# Patient Record
Sex: MALE | Race: WHITE | NOT HISPANIC OR LATINO | Employment: FULL TIME | URBAN - METROPOLITAN AREA
[De-identification: names, ages, dates, MRNs, and addresses within clinical notes are randomized per-mention and may not be internally consistent; named-entity substitution may affect disease eponyms.]

---
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
## 2024-01-07 PROBLEM — A60.01 HERPES SIMPLEX INFECTION OF PENIS: Status: ACTIVE | Noted: 2024-01-07

## 2024-01-08 ENCOUNTER — OFFICE VISIT (OUTPATIENT)
Dept: INTERNAL MEDICINE | Facility: CLINIC | Age: 19
End: 2024-01-08
Payer: OTHER GOVERNMENT

## 2024-01-08 DIAGNOSIS — R21 RASH: Primary | ICD-10-CM

## 2024-01-08 PROCEDURE — 99214 OFFICE O/P EST MOD 30 MIN: CPT | Mod: 95,,, | Performed by: INTERNAL MEDICINE

## 2024-01-08 RX ORDER — VALACYCLOVIR HYDROCHLORIDE 500 MG/1
500 TABLET, FILM COATED ORAL 2 TIMES DAILY
Qty: 30 TABLET | Refills: 0 | Status: SHIPPED | OUTPATIENT
Start: 2024-01-08 | End: 2024-02-07 | Stop reason: SDUPTHER

## 2024-01-08 NOTE — PROGRESS NOTES
Subjective:      Patient ID: Kelechi Suh is a 18 y.o. male.    Chief Complaint: No chief complaint on file.    Rash  This is a recurrent problem. The current episode started more than 1 month ago. The problem has been waxing and waning since onset. The affected locations include the face. The rash is characterized by pain and redness. It is unknown if there was an exposure to a precipitant. Pertinent negatives include no anorexia, congestion, cough, diarrhea, eye pain, facial edema, fatigue, fever, joint pain, nail changes, rhinorrhea, shortness of breath, sore throat or vomiting. Past treatments include antibiotic cream and oral steroids. The treatment provided moderate relief. His past medical history is significant for asthma and eczema. There is no history of allergies or varicella.       The patient location is: [place]  The chief complaint leading to consultation is: rash    Visit type:  Audiovisual    Face to Face time with patient: 15   minutes of total time spent on the encounter, which includes face to face time and non-face to face time preparing to see the patient (eg, review of tests), Obtaining and/or reviewing separately obtained history, Documenting clinical information in the electronic or other health record, Independently interpreting results (not separately reported) and communicating results to the patient/family/caregiver, or Care coordination (not separately reported).         Each patient to whom he or she provides medical services by telemedicine is:  (1) informed of the relationship between the physician and patient and the respective role of any other health care provider with respect to management of the patient; and (2) notified that he or she may decline to receive medical services by telemedicine and may withdraw from such care at any time.    Notes:     19 yo with   Patient Active Problem List   Diagnosis    Attention deficit hyperactivity disorder (ADHD)    Herpes simplex  infection of penis     Past Medical History:   Diagnosis Date    ADHD (attention deficit hyperactivity disorder)     Asthma     Asthma        Presents via telehealth to discuss rash to face.  No recurrent outbreak in genital area since initial episode in apri.    Has noticed waxing and waning of scattered rash to face. Lips mostly in angles and skin of chin , occ to mustache area. No blisters. Usually pustular and ulcerate after he pops them. Valtrex for 5 days improves symptoms but does not resolve symptoms.      Review of Systems   Constitutional:  Negative for fatigue and fever.   HENT:  Negative for congestion, rhinorrhea and sore throat.    Eyes:  Negative for pain.   Respiratory:  Negative for cough and shortness of breath.    Gastrointestinal:  Negative for anorexia, diarrhea and vomiting.   Musculoskeletal:  Negative for joint pain.   Skin:  Positive for rash. Negative for nail changes.     Objective:   There were no vitals taken for this visit.    Physical Exam  Constitutional:       General: He is awake.      Appearance: Normal appearance.   HENT:      Head: Normocephalic and atraumatic.   Eyes:      Conjunctiva/sclera: Conjunctivae normal.   Pulmonary:      Effort: Pulmonary effort is normal.   Musculoskeletal:      Cervical back: Normal range of motion.   Neurological:      Mental Status: He is alert. Mental status is at baseline.   Psychiatric:         Mood and Affect: Mood normal.         Behavior: Behavior normal. Behavior is cooperative.         Thought Content: Thought content normal.         Judgment: Judgment normal.     Few scattered pinpoint erythematous papules noted to chin area.  Somewhat cracking rash to bilateral angles of the mouth.    Lab Results   Component Value Date    WBC 3.71 (L) 12/28/2023    HGB 15.2 12/28/2023    HGB 15.1 11/21/2022    HGB 14.4 12/28/2020    HCT 45.3 12/28/2023    MCV 91 12/28/2023    MCV 94 11/21/2022    MCV 88 12/28/2020     12/28/2023    CHOL 144  04/21/2023    TRIG 45 04/21/2023    HDL 74 04/21/2023    LDLCALC 61.0 (L) 04/21/2023    ALT 20 12/28/2023    AST 26 12/28/2023     12/28/2023    K 3.9 12/28/2023    CALCIUM 9.4 12/28/2023     12/28/2023    CO2 35 (H) 12/28/2023    BUN 13 12/28/2023    CREATININE 0.9 12/28/2023    CREATININE 0.9 04/21/2023    CREATININE 0.9 11/21/2022    EGFRNORACEVR >60.0 12/28/2023    EGFRNORACEVR SEE COMMENT 04/21/2023    EGFRNORACEVR SEE COMMENT 11/21/2022    GLU 66 (L) 12/28/2023          The ASCVD Risk score (Diann TEIXEIRA, et al., 2019) failed to calculate for the following reasons:    The 2019 ASCVD risk score is only valid for ages 40 to 79     Assessment:     1. Rash      Plan:   1. Rash  -     Ambulatory referral/consult to Dermatology; Future; Expected date: 01/15/2024    Other orders  -     valACYclovir (VALTREX) 500 MG tablet; Take 1 tablet (500 mg total) by mouth 2 (two) times daily.  Dispense: 30 tablet; Refill: 0    Unclear if acne presentation, angular cheilitis, HSV 1, will try Valtrex as patient requests this in feels that it improved symptoms.  Will try preventative daily dose for 1 month.  Recommend further evaluation by Dermatology for diagnosis and treatment plan..    There are no Patient Instructions on file for this visit.    Future Appointments   Date Time Provider Department Center   1/29/2024  9:10 AM [facility] [place]       Lab Frequency Next Occurrence   X-Ray Calcaneus 2 View Left Once 05/05/2023   CBC Auto Differential Once 12/29/2023       No follow-ups on file.

## 2024-01-29 ENCOUNTER — LAB VISIT (OUTPATIENT)
Dept: LAB | Facility: HOSPITAL | Age: 19
End: 2024-01-29
Attending: INTERNAL MEDICINE
Payer: OTHER GOVERNMENT

## 2024-01-29 DIAGNOSIS — D72.819 LEUKOPENIA, UNSPECIFIED TYPE: ICD-10-CM

## 2024-01-29 LAB
BASOPHILS # BLD AUTO: 0.04 K/UL (ref 0–0.2)
BASOPHILS NFR BLD: 0.5 % (ref 0–1.9)
DIFFERENTIAL METHOD BLD: ABNORMAL
EOSINOPHIL # BLD AUTO: 0.3 K/UL (ref 0–0.5)
EOSINOPHIL NFR BLD: 4.1 % (ref 0–8)
ERYTHROCYTE [DISTWIDTH] IN BLOOD BY AUTOMATED COUNT: 13.5 % (ref 11.5–14.5)
HCT VFR BLD AUTO: 44 % (ref 40–54)
HGB BLD-MCNC: 15.1 G/DL (ref 14–18)
IMM GRANULOCYTES # BLD AUTO: 0.02 K/UL (ref 0–0.04)
IMM GRANULOCYTES NFR BLD AUTO: 0.2 % (ref 0–0.5)
LYMPHOCYTES # BLD AUTO: 1.8 K/UL (ref 1–4.8)
LYMPHOCYTES NFR BLD: 21.6 % (ref 18–48)
MCH RBC QN AUTO: 31.2 PG (ref 27–31)
MCHC RBC AUTO-ENTMCNC: 34.3 G/DL (ref 32–36)
MCV RBC AUTO: 91 FL (ref 82–98)
MONOCYTES # BLD AUTO: 0.7 K/UL (ref 0.3–1)
MONOCYTES NFR BLD: 8.7 % (ref 4–15)
NEUTROPHILS # BLD AUTO: 5.4 K/UL (ref 1.8–7.7)
NEUTROPHILS NFR BLD: 64.9 % (ref 38–73)
NRBC BLD-RTO: 0 /100 WBC
PLATELET # BLD AUTO: 241 K/UL (ref 150–450)
PMV BLD AUTO: 10.3 FL (ref 9.2–12.9)
RBC # BLD AUTO: 4.84 M/UL (ref 4.6–6.2)
WBC # BLD AUTO: 8.3 K/UL (ref 3.9–12.7)

## 2024-01-29 PROCEDURE — 36415 COLL VENOUS BLD VENIPUNCTURE: CPT | Mod: PO | Performed by: INTERNAL MEDICINE

## 2024-01-29 PROCEDURE — 85025 COMPLETE CBC W/AUTO DIFF WBC: CPT | Mod: PO | Performed by: INTERNAL MEDICINE

## 2024-02-07 RX ORDER — VALACYCLOVIR HYDROCHLORIDE 500 MG/1
500 TABLET, FILM COATED ORAL DAILY
Qty: 30 TABLET | Refills: 0 | Status: SHIPPED | OUTPATIENT
Start: 2024-02-07 | End: 2024-02-12

## 2024-02-07 NOTE — TELEPHONE ENCOUNTER
No care due was identified.  [facility] Embedded Care Due Messages. Reference number: [id number].   2/07/2024 7:34:08 AM CST

## 2024-02-12 ENCOUNTER — OFFICE VISIT (OUTPATIENT)
Dept: INTERNAL MEDICINE | Facility: CLINIC | Age: 19
End: 2024-02-12
Payer: OTHER GOVERNMENT

## 2024-02-12 ENCOUNTER — TELEPHONE (OUTPATIENT)
Dept: INTERNAL MEDICINE | Facility: CLINIC | Age: 19
End: 2024-02-12
Payer: OTHER GOVERNMENT

## 2024-02-12 DIAGNOSIS — R21 RASH: Primary | ICD-10-CM

## 2024-02-12 PROCEDURE — 99214 OFFICE O/P EST MOD 30 MIN: CPT | Mod: 95,,, | Performed by: INTERNAL MEDICINE

## 2024-02-12 RX ORDER — VALACYCLOVIR HYDROCHLORIDE 1 G/1
TABLET, FILM COATED ORAL
Qty: 90 TABLET | Refills: 0 | Status: SHIPPED | OUTPATIENT
Start: 2024-02-12 | End: 2024-06-03

## 2024-02-12 NOTE — PROGRESS NOTES
Subjective:      Patient ID: Kelechi Suh is a 18 y.o. male.    Chief Complaint: Follow-up    HPI    The patient location is: [place]  The chief complaint leading to consultation is: rash    Visit type:  Audiovisual    Face to Face time with patient: 15   minutes of total time spent on the encounter, which includes face to face time and non-face to face time preparing to see the patient (eg, review of tests), Obtaining and/or reviewing separately obtained history, Documenting clinical information in the electronic or other health record, Independently interpreting results (not separately reported) and communicating results to the patient/family/caregiver, or Care coordination (not separately reported).         Each patient to whom he or she provides medical services by telemedicine is:  (1) informed of the relationship between the physician and patient and the respective role of any other health care provider with respect to management of the patient; and (2) notified that he or she may decline to receive medical services by telemedicine and may withdraw from such care at any time.    Notes:     Presents today to discuss rash. Continues with rash to genitals and lips/face. Has been taking valtrex 500 bid for 4 to 5 weeks. He is not clear as to if rash on genitals and mouth have ever completely resolved.  He is frustrated that valcyclovir has not resolved his issue. 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     Review of Systems   Constitutional:  Positive for activity change. Negative for chills, fever and unexpected weight change.   HENT:  Positive for rhinorrhea and trouble swallowing. Negative for ear pain, hearing loss and sore throat.    Eyes:  Negative for discharge and visual disturbance.   Respiratory:  Negative for cough, chest tightness and wheezing.    Cardiovascular:  Negative for chest pain and palpitations.   Gastrointestinal:  Negative for abdominal pain, blood in stool, constipation,  diarrhea and vomiting.   Endocrine: Negative for polydipsia and polyuria.   Genitourinary:  Negative for difficulty urinating, dysuria, hematuria and urgency.   Musculoskeletal:  Negative for arthralgias, joint swelling and neck pain.   Neurological:  Negative for seizures, syncope, weakness and headaches.   Psychiatric/Behavioral:  Negative for confusion and dysphoric mood.      Objective:   There were no vitals taken for this visit.    Physical Exam  Constitutional:       General: He is awake.      Appearance: Normal appearance.   HENT:      Head: Normocephalic and atraumatic.   Eyes:      Conjunctiva/sclera: Conjunctivae normal.   Pulmonary:      Effort: Pulmonary effort is normal.   Musculoskeletal:      Cervical back: Normal range of motion.   Neurological:      Mental Status: He is alert. Mental status is at baseline.   Psychiatric:         Mood and Affect: Mood normal.         Behavior: Behavior normal. Behavior is cooperative.         Thought Content: Thought content normal.         Judgment: Judgment normal.         Lab Results   Component Value Date    WBC 8.30 01/29/2024    HGB 15.1 01/29/2024    HGB 15.2 12/28/2023    HGB 15.1 11/21/2022    HCT 44.0 01/29/2024    MCV 91 01/29/2024    MCV 91 12/28/2023    MCV 94 11/21/2022     01/29/2024    CHOL 144 04/21/2023    TRIG 45 04/21/2023    HDL 74 04/21/2023    LDLCALC 61.0 (L) 04/21/2023    ALT 20 12/28/2023    AST 26 12/28/2023     12/28/2023    K 3.9 12/28/2023    CALCIUM 9.4 12/28/2023     12/28/2023    CO2 35 (H) 12/28/2023    BUN 13 12/28/2023    CREATININE 0.9 12/28/2023    CREATININE 0.9 04/21/2023    CREATININE 0.9 11/21/2022    EGFRNORACEVR >60.0 12/28/2023    EGFRNORACEVR SEE COMMENT 04/21/2023    EGFRNORACEVR SEE COMMENT 11/21/2022    GLU 66 (L) 12/28/2023          The ASCVD Risk score (Diann DK, et al., 2019) failed to calculate for the following reasons:    The 2019 ASCVD risk score is only valid for ages 40 to 79      Assessment:     1. Rash      Plan:   1. Rash  -     Ambulatory referral/consult to Dermatology; Future; Expected date: 02/19/2024  -     valACYclovir (VALTREX) 1000 MG tablet; Two tablets twice daily for one day and then decrease to 1 tablet daily thereafter.  Dispense: 90 tablet; Refill: 0    Non resolving on valtrex. Derm for further evaluation.   Will try one day increase to 2000 mg valtrex bid for one day and continue 1000mg daily for now. .     Patient Instructions   Increase your valcyclovir to 2000mg(2grams) twice a day for one day then decrease to 1000mg(1gram) daily.     No future appointments.      Lab Frequency Next Occurrence   X-Ray Calcaneus 2 View Left Once 05/05/2023   Ambulatory referral/consult to Dermatology Once 01/15/2024       Follow up if symptoms worsen or fail to improve.

## 2024-02-12 NOTE — TELEPHONE ENCOUNTER
"Contacted pt to schedule dermatology appt. Pt very rudely stated " dont even worry about it, ill figure it out myself." Attempted to inform pt of new medication dosage that was sent to pharmacy. Pt ended call abruptly.   "

## 2024-02-12 NOTE — PATIENT INSTRUCTIONS
Increase your valcyclovir to 2000mg(2grams) twice a day for one day then decrease to 1000mg(1gram) daily.

## 2024-06-03 ENCOUNTER — HOSPITAL ENCOUNTER (OUTPATIENT)
Dept: RADIOLOGY | Facility: HOSPITAL | Age: 19
Discharge: HOME | End: 2024-06-03
Attending: STUDENT IN AN ORGANIZED HEALTH CARE EDUCATION/TRAINING PROGRAM
Payer: OTHER GOVERNMENT

## 2024-06-03 ENCOUNTER — OFFICE VISIT (OUTPATIENT)
Dept: FAMILY MEDICINE | Facility: CLINIC | Age: 19
End: 2024-06-03
Payer: OTHER GOVERNMENT

## 2024-06-03 VITALS
SYSTOLIC BLOOD PRESSURE: 124 MMHG | HEIGHT: 71 IN | WEIGHT: 175.19 LBS | RESPIRATION RATE: 16 BRPM | HEART RATE: 72 BPM | TEMPERATURE: 98 F | DIASTOLIC BLOOD PRESSURE: 73 MMHG | BODY MASS INDEX: 24.52 KG/M2 | OXYGEN SATURATION: 95 %

## 2024-06-03 DIAGNOSIS — G89.29 CHRONIC PAIN OF BOTH SHOULDERS: ICD-10-CM

## 2024-06-03 DIAGNOSIS — F32.A ANXIETY AND DEPRESSION: ICD-10-CM

## 2024-06-03 DIAGNOSIS — M25.511 CHRONIC PAIN OF BOTH SHOULDERS: ICD-10-CM

## 2024-06-03 DIAGNOSIS — M25.512 CHRONIC PAIN OF BOTH SHOULDERS: ICD-10-CM

## 2024-06-03 DIAGNOSIS — L70.9 ACNE, UNSPECIFIED ACNE TYPE: Primary | ICD-10-CM

## 2024-06-03 DIAGNOSIS — Z00.00 ANNUAL PHYSICAL EXAM: ICD-10-CM

## 2024-06-03 DIAGNOSIS — F41.9 ANXIETY AND DEPRESSION: ICD-10-CM

## 2024-06-03 PROCEDURE — 73030 X-RAY EXAM OF SHOULDER: CPT | Mod: TC,50,PO

## 2024-06-03 PROCEDURE — 99999 PR PBB SHADOW E&M-EST. PATIENT-LVL V: CPT | Mod: PBBFAC,,, | Performed by: STUDENT IN AN ORGANIZED HEALTH CARE EDUCATION/TRAINING PROGRAM

## 2024-06-03 PROCEDURE — 99395 PREV VISIT EST AGE 18-39: CPT | Mod: S$PBB,,, | Performed by: STUDENT IN AN ORGANIZED HEALTH CARE EDUCATION/TRAINING PROGRAM

## 2024-06-03 PROCEDURE — 99215 OFFICE O/P EST HI 40 MIN: CPT | Mod: PBBFAC,25,PO | Performed by: STUDENT IN AN ORGANIZED HEALTH CARE EDUCATION/TRAINING PROGRAM

## 2024-06-03 PROCEDURE — 73030 X-RAY EXAM OF SHOULDER: CPT | Mod: 26,50,, | Performed by: RADIOLOGY

## 2024-06-03 RX ORDER — DOXYCYCLINE HYCLATE 100 MG
100 TABLET ORAL 2 TIMES DAILY
Qty: 14 TABLET | Refills: 0 | Status: SHIPPED | OUTPATIENT
Start: 2024-06-03 | End: 2024-06-10

## 2024-06-03 RX ORDER — CLINDAMYCIN AND BENZOYL PEROXIDE 10; 50 MG/G; MG/G
GEL TOPICAL 2 TIMES DAILY
Qty: 50 G | Refills: 1 | Status: SHIPPED | OUTPATIENT
Start: 2024-06-03 | End: 2025-06-03

## 2024-06-03 RX ORDER — BUPROPION HYDROCHLORIDE 150 MG/1
150 TABLET ORAL DAILY
Qty: 90 TABLET | Refills: 3 | Status: SHIPPED | OUTPATIENT
Start: 2024-06-03 | End: 2025-06-03

## 2024-06-03 NOTE — PATIENT INSTRUCTIONS
[email or web address]  Typically excepts insurance     JOI [place]  [phone]    [employer]  Typically excepts insurance     [facility]  Mental health service.  [address]  [place]  [phone]    Damaris Wright, therapist   [place] office  Accepts insurance  [phone]       [facility]  Sees all ages   [address] [place]  [address]  Phone:  [phone]  Fax:  [phone]      [facility]  Dr. Diamond Jeffries  [address]  [place] [phone]      [facility]  Olya Locke MA, LPC  [place]  [phone]  [email or web address]       [facility]  [place]: [phone]  Swapnil Mccray: [phone]  [place]: [phone]  [place]: [phone]      [facility]  Netta Awad MA, LPC,[facility]  Individual, couples, and family counseling  [address] [place] [address]  [place]   [phone]    [facility]  [address] [place] [address]  [place] [address] [phone]  Anam Gregory [facility]      [facility] , [facility]  Colette Rodriguez (Sherry)  [phone] (txt/call)   [email or web address]  Virtual visits   Accept insurance       [facility]  [address]  [place] [address] [phone]  Tiffanie Rm, Ph.D., M.P  Rashad Escalera, Ph.D., M.P      [facility]  [address] [place] [address]  Yoly Robert LPC  [phone]  Anna Marie Parra LPC, [place]  [phone]        Syeda Garber LPC, LMFT  [address]  [place] [address]  [phone]        Nan Rivera MA, LPC  Phone: [phone]  Fax: [phone]  5yrs- Adult  Areas of Service: Depression, anxiety, medication management, substance abuse, anger management, coping with grief, communication skills, parenting  skills, and addiction.  Accepts private insurance and cash payments        Dr. Nasim Pena- [place]  [facility]  [address]   [address]  [place], [place] [id number] [phone]  ACCEPTED INSURANCE:  [facility]  Aetna  [facility]  [facility]  [employer]  Cigna  Com [employer]  Humana  James  [facility]  Value Options        Sacha Bruno, [facility] counselor  [facility]  [address]   [place], [place] [id number] [phone]  AVG Cost per Session: $80 - $130  ACCEPTED INSURANCE:  [facility]  Value Options  (License to [facility])        [facility]  [address] [address]  Accept Medicaid  Other [facility].H.C  [facility].H.C  [facility] CH.C  [facility].H.[facility] C.H.C  [facility].H.C  [facility]  [facility]  [facility].C    Our skilled providers specialize in treating:  PTSD, Anxiety and Depression, Bipolar Disorder, ADHD, Obesity, Marital Distress, Chronic Tension, Panic Attacks, as well as Phobias      [facility]  [address], [place] [address]  Hours: Monday-Friday, 8 a.m.-5 p.m  Phone: [phone]  [facility]  [address] [place] [address]  Tel. [phone]  Fax [phone]  [facility]  [address] [place] [address]  Tel. [phone]  Fax [phone]  [facility]  [address] [place] [address]  Tel. [phone]  Fax [phone]  [facility]  [address] [place] [address], [address] [address] [place] [address]  Tel. [phone]  Fax [phone]  Walk -In till 4pm all  insurances accepted      [facility]  Our skilled providers specialize in treating:  Attention Deficit Hyperactivity Disorder  Attention Deficit Disorder  Obsessive Compulsive Disorder   Autism  Bipolar Disorder  Depression   Anxiety  A variety of other psychological disorders  [place]  Phone: [phone]  [place]  [address] [place], [place], [address]  [phone]  [place]  [address] [place], [address]  [phone]  [place]  [address] [place], [place], [address]  [phone]  [place]  [address] [place] [address]  [phone]  ACCEPTED INSURANCE:  AETNA  [facility]  [facility]  HUMANA  [place]  [place]ICA[place]  TRICARE MULTIPLAN PHCS UNITED HEALTHCARE UNITED BEHAVIORAL HEALTH [facility]  MD Arie Hu NP Elisa Himel, NP  The providers of [facility] [place] help patients age 16 and over with the treatment of a variety of mental disorders, including:  Stress  Depression  Anxiety  Schizophrenia  Dementia  Bipolar  Developmental disabilities  Other psychiatric mental health issues  [facility]   [address]   [place] [address]   Hours: Monday-Friday, 8 a.m.-5 p.m.   Phone: [phone]   Fax: [phone]  ACCEPTED INSURANCE  [facility] (PPO, OGB-PPO)*  Humana  Medicare  *PPO: Preferred Provider Organization        Medicaid Community Psychiatry Clinics  [facility]: [phone]  [facility]: [phone]  [facility]: [phone]  [facility]: [phone]  [facility]: [phone]

## 2024-06-03 NOTE — PROGRESS NOTES
Problem List Items Addressed This Visit          Psychiatric    Anxiety and depression    Overview     Reports recurrent intrusive thoughts. Reports passive SI, would never act on passive thoughts as he would hurt his mom too much.   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    Complete and thorough medication reconciliation was performed. Discussed risks and benefits of medications.    Advised patient on orders and health maintenance.    Continue current medications listed on your summary sheet.    All questions were answered.   Follow up as planned or prn                 Patient ID: Kelechi Suh is a 19 y.o. male.    Chief Complaint:  establish care    Patient is here to establish care.     Presents today to discuss rash. Continues with rash to genitals and lips/face. Has been taking valtrex 500 bid for 4 to 5 weeks. He is not clear as to if rash on genitals and mouth have ever completely resolved.  He is frustrated that valcyclovir has not resolved his issue. 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     Last took valtrex several months ago: genitals never  returned; however chin and perioral lesions still present (unchanged). He has not tried acne face wash or po antibiotics.     Reports recurrent intrusive thoughts. Reports passive SI, would never act on passive thoughts as he would hurt his mom too much.         Colonoscopy: No personal history of colon cancer, hematochezia, melena, crohn's, ulcerative colitis; No family history of colon cancer.      Health Maintenance Topics with due status: Not Due       Topic Last Completion Date    Influenza Vaccine 10/04/2022    TETANUS VACCINE 11/21/2022        ==============================================  History reviewed.   Health Maintenance Due   Topic Date Due    COVID-19 Vaccine (1 - 2023-24 season) Never done       Past Medical History:  Past Medical History:   Diagnosis Date    ADHD (attention deficit hyperactivity disorder)     Asthma     Asthma      Past Surgical History:   Procedure Laterality Date    Arm Surgery Left 04/2016    ARTHROSCOPY OF KNEE Right 12/28/2020    Procedure: ARTHROSCOPY, KNEE;  Surgeon: Adrien Antony MD;  [facility];  Service: Orthopedics;  Laterality: Right;    CIRCUMCISION       Review of patient's allergies indicates:  No Known Allergies  No current outpatient medications on file prior to visit.     No current facility-administered medications on file prior to visit.     Social History     Socioeconomic History    Marital status: Single   Tobacco Use    Smoking status: Former     Types: Vaping with nicotine, Cigarettes    Smokeless tobacco: Never   Substance and Sexual Activity    Alcohol use: Not Currently     Comment: occassional    Drug use: Never    Sexual activity: Yes     Partners: Female     Birth control/protection: Condom   Social History Narrative    ** Merged History Encounter **         ** Merged History Encounter **         Lives with mother, step-father, brother and sister.  No smokers.  They have a cat.  Is in the 7th grade.  Active in sports.     Social  Determinants of Health     Financial Resource Strain: Low Risk  (12/20/2023)    Overall Financial Resource Strain (CARDIA)     Difficulty of Paying Living Expenses: Not hard at all   Food Insecurity: Unknown (12/20/2023)    Hunger Vital Sign     Worried About Running Out of Food in the Last Year: Never true   Transportation Needs: No Transportation Needs (12/20/2023)    PRAPARE - Transportation     Lack of Transportation (Medical): No     Lack of Transportation (Non-Medical): No   Physical Activity: Sufficiently Active (12/20/2023)    Exercise Vital Sign     Days of Exercise per Week: 5 days     Minutes of Exercise per Session: 60 min   Stress: No Stress Concern Present (12/20/2023)    [employer] [employer] - Occupational Stress Questionnaire     Feeling of Stress : Not at all   Housing Stability: Low Risk  (12/20/2023)    Housing Stability Vital Sign     Unable to Pay for Housing in the Last Year: No     Number of Places Lived in the Last Year: 1     Unstable Housing in the Last Year: No     Family History   Problem Relation Name Age of Onset    ADD / ADHD Sister      Drug abuse Father Ye ortega           Review of Systems   12 point review of systems per hpi, otherwise negative         Objective:    Nursing note and vitals reviewed.  Vitals:    06/03/24 1420   BP: 124/73   Pulse: 72   Resp: 16   Temp: 97.8 °F (36.6 °C)     Body mass index is 24.44 kg/m².     Physical Exam   Constitutional:oriented to person, place, and time. appears well-developed and well-nourished. No distress.   HENT: perioral erythematous papular rash   Head: Normocephalic and atraumatic.   Eyes: Pupils are equal, round, and reactive to light. EOM are normal.   Neck: Normal range of motion. Neck supple.   Cardiovascular: Normal rate, regular rhythm, normal heart sounds and intact distal pulses.   No murmur heard.  Pulmonary/Chest: Effort normal and breath sounds normal. No respiratory distress. no wheezes.   GI: soft,  non distended, no ttp, no rebound/guarding  Musculoskeletal: Normal range of motion. no edema. Shoulders full rom and strength, no ttp  Neurological: alert and oriented to person, place, and time. No cranial nerve deficit.   Skin: Skin is warm and dry. Capillary refill takes less than 2 seconds.   Psychiatric: normal mood and affect. behavior is normal.           Colleen Thao MD    We Offer Telehealth & Same Day Appointments!   Book your Telehealth appointment with me through my nurse or   Clinic appointments on VitrinaharReferMe!  [phone]     To Schedule appointments online, go to [employer]: [email or web address]

## 2024-06-05 ENCOUNTER — TELEPHONE (OUTPATIENT)
Dept: FAMILY MEDICINE | Facility: CLINIC | Age: 19
End: 2024-06-05
Payer: OTHER GOVERNMENT

## 2024-06-05 DIAGNOSIS — M25.512 CHRONIC PAIN OF BOTH SHOULDERS: Primary | ICD-10-CM

## 2024-06-05 DIAGNOSIS — M25.511 CHRONIC PAIN OF BOTH SHOULDERS: Primary | ICD-10-CM

## 2024-06-05 DIAGNOSIS — G89.29 CHRONIC PAIN OF BOTH SHOULDERS: Primary | ICD-10-CM

## 2024-06-05 NOTE — TELEPHONE ENCOUNTER
Orders Placed This Encounter   Procedures    Ambulatory referral/consult to Physical/Occupational Therapy     Standing Status:   Future     Standing Expiration Date:   7/5/2025     Referral Priority:   Routine     Referral Type:   Physical Medicine     Referral Reason:   Specialty Services Required     Referred to Provider:   Marie Zavaleta, SOPHY     Requested Specialty:   Physical Therapy     Number of Visits Requested:   1

## 2024-06-05 NOTE — TELEPHONE ENCOUNTER
----- Message from Enrique Sanchez LPN sent at 6/5/2024  1:39 PM CDT -----  Spoke with pt who verbalized understanding at this time, stated he would like to start PT.

## 2024-06-05 NOTE — PROGRESS NOTES
I have sent a msg to patient with the following interpretation (see below):    Xr shoulders normal     We can send for PT eval if you would like    Please do not hesitate to call or message with any questions or concerns    Colleen Thao MD

## 2024-06-18 ENCOUNTER — CLINICAL SUPPORT (OUTPATIENT)
Dept: REHABILITATION | Facility: HOSPITAL | Age: 19
End: 2024-06-18
Payer: OTHER GOVERNMENT

## 2024-06-18 DIAGNOSIS — Z78.9 IMPAIRED ACTIVITIES OF DAILY LIVING: Primary | ICD-10-CM

## 2024-06-18 DIAGNOSIS — M25.511 CHRONIC PAIN OF BOTH SHOULDERS: ICD-10-CM

## 2024-06-18 DIAGNOSIS — M25.512 CHRONIC PAIN OF BOTH SHOULDERS: ICD-10-CM

## 2024-06-18 DIAGNOSIS — G89.29 CHRONIC PAIN OF BOTH SHOULDERS: ICD-10-CM

## 2024-06-18 PROCEDURE — 97161 PT EVAL LOW COMPLEX 20 MIN: CPT | Mod: PN | Performed by: GENERAL ACUTE CARE HOSPITAL

## 2024-06-18 PROCEDURE — 97110 THERAPEUTIC EXERCISES: CPT | Mod: PN | Performed by: GENERAL ACUTE CARE HOSPITAL

## 2024-06-18 NOTE — PLAN OF CARE
[facility]   Physical Therapy Initial Evaluation    Name: Kelechi Sewell Vikash  [facility] Number: [id number]    Therapy Diagnosis:   Encounter Diagnoses   Name Primary?    Chronic pain of both shoulders     Impaired activities of daily living Yes     Physician: Colleen Thao MD    Physician Orders: PT Eval and Treat   Medical Diagnosis from Referral: Chronic bilateral shoulder pain   Evaluation Date: 6/18/2024  Authorization Period Expiration: 21/31/24  Plan of Care Expiration: 8/27/24  Progress Note Due: 7/18/24  Visit # / Visits authorized: 1/1 evaluation  FOTO: 1/3 Shoulder 61/100    Precautions: Standard     Date: 6/18/2024   Time In: 1520  Time Out: 1415  Total Appointment Time (timed & untimed codes): 55 minutes  Subjective   Date of onset: Chronic 1+ years - bilateral shoulders   History of current condition - Kelechi reports: Progressive bilateral anterior/superior shoulder pain impacting work tolerance and recreational activity performance. 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 Patient reports trying to workout (even simple exercises like push-ups and lateral raises) but that causes pain in the shoulders. He reports pain with lifting doors and overhead tasks as well. No injuries noted. No dislocated noted. No prior therapy, medication or medical treatment for shoulders to date  Surgical: [x]No []Yes (procedure:   )  Weight Bearing Status: FWB  Dominant Extremity: R Handed   Falls: none   Imaging: x-ray:unremarkable     Prior Therapy: none   Social History: lives alone - Apartment at the [facility] in [place]   Occupation: Fabricator   Prior Level of Function: Independent  Current Level of Function: Independent     Pain:  Current 3/10, worst 8/10, best 1/10   Location: Anterior/superior shoulder - bilateral   Description: Aching, Throbbing, Grabbing, Tight, Deep, and Sharp  Aggravating Factors: Laying, Lifting, and grabbing wide  objects, overhead lifting, push-ups, working out   Easing Factors: relaxation and rest    Patients goals: pain free work performance, working out     Medical History:   Past Medical History:   Diagnosis Date    ADHD (attention deficit hyperactivity disorder)     Asthma     Asthma      Surgical History:   Kelechi Suh  has a past surgical history that includes Circumcision; Arm Surgery (Left, 04/2016); and Arthroscopy of knee (Right, 12/28/2020).  Medications:   Kelechi has a current medication list which includes the following prescription(s): bupropion and clindamycin-benzoyl peroxide.  Allergies:   Review of patient's allergies indicates:  No Known Allergies     Objective    NT = not tested due to pain, inability to obtain test position, or relevancy    RANGE OF MOTION:  Shoulder AROM/PROM Left  6/18/2024 Pain/Dysfunction with Movement Right  6/18/2024 Pain/Dysfunction with Movement   Forward Flexion (180) 175 []No []Yes 175 []No []Yes   Abduction (180) 180 []No []Yes 180 []No []Yes   Extension (60) 60 []No []Yes 60 []No []Yes   Functional ER (C7) WFL []No []Yes WFL []No []Yes   Functional IR (T10) WFL []No []Yes WFL []No []Yes   TROM (160-180)         Cervical:  Rotation R: 75 ° / L: 70 °   Lateral flexion R: 45 ° / L: 45 °       STRENGTH:  U/E MMT Left  6/18/2024 Right  6/18/2024 Goal   Shoulder Flexion 4+/5 4+/5 5/5 B   Shoulder Abduction 4+/5 4+/5 5/5 B   Shoulder IR 5/5 5/5 5/5 B   Shoulder ER 4+/5 4+/5 5/5 B   Elbow Flexion  5/5 5/5 5/5 B     SPECIAL TESTS:  Upper Extremity  Left  6/18/2024 Right  6/18/2024   Shoulder    Carey Adelso (impingement) [x]+ []- []NT [x]+ []- []NT   Humza(impingement) [x]+ []- []NT []+ [x]- []NT   Leandra (biceps tendon) []+ [x]- []NT []+ [x]- []NT   Relocation (anterior instability) []+ [x]- []NT []+ [x]- []NT   Apprehension (anterior instability) []+ [x]- []NT []+ [x]- []NT   Sulcus Sign (inferior instability) [x]+ []- []NT [x]+ []- []NT   AC Crossover []+ [x]- []NT  []+ [x]- []NT   AC Compression []+ [x]- []NT []+ [x]- []NT   Drop Arm (supraspinatus) []+ [x]- []NT []+ [x]- []NT   Elbow   Moving Valgus / Milking []+ [x]- []NT []+ [x]- []NT   Valgus []+ [x]- []NT []+ [x]- []NT   Varus []+ [x]- []NT []+ [x]- []NT     Cervical testing: Positive lower cervical compression / Spurlings      Sensation:  Sensation is intact to light touch  Palpation: Increased tone and tenderness noted with palpation to: anterior/superior bilateral shoulders, R biceps tendon long head, supraspinatus tendon attachment R  Posture:  Pt presents with postural abnormalities which include  []None  []Forward head  [x]Rounded shoulders  []Thoracic Kyphosis  []Lumbar Lordosis  [x]Slouched Sitting or Standing  []Other:    Limitation/Restriction for FOTO Shoulder Survey  Therapist reviewed FOTO scores for Kelechi on 6/18/2024 .   FOTO documents entered into [email or web address] - see Media section.  Score: 61     Treatment   Total Treatment time (time-based codes) separate from Evaluation: 10 minutes     Kelechi received following skilled interventions listed below:    PT Intervention Parameters Time   Therapeutic Exercise to develop strength, endurance, ROM, flexibility, posture, and core stabilization Scapular clocks, bilateral external rotation, supine protraction, Thrower' 10 (phase I - below shoulder height) 10 minutes (1)      Patient Education and Home Exercises   Education provided:   Patient was educated on the role of Physical Therapy, Plan of Care, treatment plan, discharge goals and clinic call/cancel/no show policy.  Patient educated on biomechanical justification for physical therapy and importance of compliance with Home Exercise Program in order to improve overall impairments and Quality of Life.    Clinic Policies:  Patient was provided with physical copy of [facility] Policies necessary for therapy treatment sessions including: Attendance, Clothing, Cell Phone and Visitors.   Patient was additionally provided  with contact information for the clinic including phone, fax and physical address for reference.   Patient verbalizes that they have received this information and is agreeable to follow these policies.     Written Home Exercises Provided: yes.   Exercises were reviewed and Kelechi  was able to demonstrate them prior to the end of the session.    Kelechi  demonstrated good  understanding of the education provided.  See EMR under Patient Instructions for exercises provided during therapy sessions.    Assessment   Kelechi is a 19 y.o. male referred to outpatient Physical Therapy. Patient presents with bilateral shoulder instability with pain upon resisted long axis flexion and abduction as well as overhead motions. Positive impingement signs and positive sulcus sign indicate dysfunction without Bilateral shoulders. There is mild scapular winging with weight bearing positions and irritation of bilateral biceps long head tendons with resisted shoulder internal/external rotation. Negative testing for relocation testing of shoulder or valgus testing for elbows. While there is no significant range of motion deficits there is mild pain to lower R cervical spine with compression and extension noting possible facet syndrome in that region.     Patient dx: bilateral shoulder instability with RTC tendon overuse injuries and impaired posture    Patient is indicated for skilled physical therapy services to impact knowledge of condition, safety awareness and improve upon aforementioned deficits. Patient is pleasant and agreeable to physical therapy plan of care.     Patient prognosis is Good.   Patient will benefit from skilled outpatient Physical Therapy to address the deficits stated above and in the chart below, provide patient /family education, and to maximize patientt's level of independence.     Plan of care discussed with: [x]Patient []Family []Patient/Family  Patient's spiritual, cultural and educational needs considered and  patient is agreeable to the plan of care and goals as stated below:     Anticipated barriers for therapy:  []None  []Cognitive  []Emotional  []Hearing  []Learning  [x]Other: scheduling around work     Medical Necessity is demonstrated by the following  History  Co-morbidities and personal factors that may impact the plan of care [] LOW: no personal factors / co-morbidities  [x] MODERATE: 1-2 personal factors / co-morbidities  [] HIGH: 3+ personal factors / co-morbidities    Moderate / High Support Documentation:  Self-reported Anxiety, self-reported depression, lives alone, chronic pain     Examination  Body Structures and Functions, activity limitations and participation restrictions that may impact the plan of care [] LOW: addressing 1-2 elements  [x] MODERATE: 3+ elements  [] HIGH: 4+ elements (please support below)    Moderate / High Support Documentation:  Body Region / System  Bilateral shoulders, R lateral cervical/neck, trunk, strength, joint laxity  Participation Restrictions    Activity Limitations       Clinical Presentation [] LOW: stable  [] MODERATE: Evolving  [] HIGH: Unstable     Decision Making/ Complexity Score: low       Goals:  Short Term Goals Status Established Target Met   Patient to be independent and compliant with foundational home exercise program performance to impact knowledge of condition  [] Met  [] Not Met  [] Progressing 6/18/2024 7/18/24    Patient to lift 8#single arm overhead without increased pain indicating improved shoulder strength and stabilization [] Met  [] Not Met  [] Progressing 6/18/2024 7/18/24    Patient to improve Shoulder FOTO to 69 to display improved activity participation [] Met  [] Not Met  [] Progressing 6/18/2024 7/18/24      Long Term Goal Status Established Target Met   Patient will display independent and correct performance of advanced home exercise program without cueing to impact knowledge of condition [] Met  [] Not Met  [] Progressing 6/18/2024 8/27/24     Patient to lift 20# + single arm overhead without increased pain indicating improved shoulder strength and stabilization [] Met  [] Not Met  [] Progressing 6/18/2024 8/27/24    Patient to perform weight bearing bilateral shoulder tasks of push-ups without scapular winging or increased pain to display improved shoulder stabilization and impact activities of daily living and recreational activity performance [] Met  [] Not Met  [] Progressing 6/18/2024 8/27/24    Patient to improve Shoulder FOTO to 77 to display improved activity participation [] Met  [] Not Met  [] Progressing 6/18/2024 8/27/24    Patient will report confidence in managing condition upon discharge from Physical Therapy. [] Met  [] Not Met  [] Progressing 6/18/2024 8/27/24      Plan   Plan of care Certification: 6/18/2024 to 8/27/24.    Outpatient Physical Therapy 1-2 times weekly for 10 weeks to include the following interventions: Cervical/Lumbar Traction, Gait Training, Manual Therapy, Moist Heat/ Ice, Neuromuscular Re-ed, Paraffin, Patient Education, Self Care, Therapeutic Activities, and Therapeutic Exercise , Electrical Stimulation (IFC, Russian), IASTM, STM, Cupping, Blood Flow Restriction as appropriate.    Marie Zavaleta PT, DPT, SCS, CSCS  Board Certified Sports Clinical Specialist   Certified Dry Needling Provider  6/18/2024

## 2024-06-24 ENCOUNTER — CLINICAL SUPPORT (OUTPATIENT)
Dept: REHABILITATION | Facility: HOSPITAL | Age: 19
End: 2024-06-24
Payer: OTHER GOVERNMENT

## 2024-06-24 DIAGNOSIS — Z78.9 IMPAIRED ACTIVITIES OF DAILY LIVING: Primary | ICD-10-CM

## 2024-06-24 PROCEDURE — 97112 NEUROMUSCULAR REEDUCATION: CPT | Mod: PN

## 2024-06-24 PROCEDURE — 97110 THERAPEUTIC EXERCISES: CPT | Mod: PN

## 2024-06-24 NOTE — PROGRESS NOTES
[facility]   Physical Therapy Treatment Note      Name: Kelechi Suh  [facility] Number: [id number]    Therapy Diagnosis:   Encounter Diagnosis   Name Primary?    Impaired activities of daily living Yes     Physician: Colleen Thao MD    Visit Date: 6/24/2024    Physician Orders: PT Eval and Treat   Medical Diagnosis from Referral: Chronic bilateral shoulder pain   Evaluation Date: 6/18/2024  Authorization Period Expiration: 21/31/24  Plan of Care Expiration: 8/27/24  Progress Note Due: 7/18/24  Visit # / Visits authorized: 1/20 auth (1/1 evaluation)  FOTO: 1/3 Shoulder 61/100     Precautions: Standard      Date: 6/18/2024   Time In: 0500 pm  Time Out: 0555 pm    Total Appointment Time (timed & untimed codes): 55 minutes    PTA Visit #: 0/5     Subjective     Patient reports: no pain prior to session. He states that shoulders feel good with most of his work duties but difficult with gym activities..    He was compliant with home exercise program.  Response to previous treatment: initial evaluation  Functional change: TBD    Pain: 0/10  Location: shoulders    Objective      Objective Measures updated at progress report unless specified.     Treatment     Kelechi received the treatments listed below:      therapeutic exercises to develop strength, endurance, ROM, flexibility, posture, and core stabilization for (15) minutes including:  UBE forward and back 3/3 6 min total  Resisted rows 2x10 reps 30#  Resisted straight arm lat 2x10 reps 17#    neuromuscular re-education activities to improve: Balance, Coordination, Kinesthetic, Sense, Proprioception, and Posture for (40) minutes. The following activities were included:  [place] ER x20 reps red cord B  Quick 90/90 IR 2x30 sec red cord B  Prone row 2x10 reps 15# B  Prone extension 2x10 reps 7.5 KB B  Supine upside down KB serratus punch 2x10 reps 7.5# B  Push up shoulder taps 2x10 reps    Patient Education and Home Exercises        Education provided:   - HEP provided and reviewed  - Anatomy and Physiology pertaining to current condition  - Possible response to exercise  - Importance of PT compliance    Written Home Exercises Provided: Patient instructed to cont prior HEP. Exercises were reviewed and Kelechi was able to demonstrate them prior to the end of the session.  Kelechi demonstrated good  understanding of the education provided. See Electronic Medical Record under Patient Instructions for exercises provided during therapy sessions    Assessment     Emphasis placed on good mechanics, shoulder stability, and pain free ROM with strengthening today. Pt notes appropriate fatigue at the end of session. Continue with PT.    Kelechi Is progressing well towards his goals.   Patient prognosis is Good.     Patient will continue to benefit from skilled outpatient physical therapy to address the deficits listed in the problem list box on initial evaluation, provide pt/family education and to maximize pt's level of independence in the home and community environment.     Patient's spiritual, cultural and educational needs considered and pt agreeable to plan of care and goals.     Anticipated barriers to physical therapy: none    Goals:  Short Term Goals Status Established Target Met   Patient to be independent and compliant with foundational home exercise program performance to impact knowledge of condition  [] Met  [] Not Met  [x] Progressing 6/18/2024 7/18/24     Patient to lift 8#single arm overhead without increased pain indicating improved shoulder strength and stabilization [] Met  [] Not Met  [x] Progressing 6/18/2024 7/18/24     Patient to improve Shoulder FOTO to 69 to display improved activity participation [] Met  [] Not Met  [] Progressing 6/18/2024 7/18/24        Long Term Goal Status Established Target Met   Patient will display independent and correct performance of advanced home exercise program without cueing to impact knowledge of  condition [] Met  [] Not Met  [x] Progressing 6/18/2024 8/27/24     Patient to lift 20# + single arm overhead without increased pain indicating improved shoulder strength and stabilization [] Met  [] Not Met  [x] Progressing 6/18/2024 8/27/24     Patient to perform weight bearing bilateral shoulder tasks of push-ups without scapular winging or increased pain to display improved shoulder stabilization and impact activities of daily living and recreational activity performance [] Met  [] Not Met  [x] Progressing 6/18/2024 8/27/24     Patient to improve Shoulder FOTO to 77 to display improved activity participation [] Met  [] Not Met  [x] Progressing 6/18/2024 8/27/24     Patient will report confidence in managing condition upon discharge from Physical Therapy. [] Met  [] Not Met  [x] Progressing 6/18/2024 8/27/24       Plan     Continue with PT POC to address functional deficits.     Jakob Tadeo, PT

## 2024-07-01 ENCOUNTER — CLINICAL SUPPORT (OUTPATIENT)
Dept: REHABILITATION | Facility: HOSPITAL | Age: 19
End: 2024-07-01
Payer: OTHER GOVERNMENT

## 2024-07-01 DIAGNOSIS — Z78.9 IMPAIRED ACTIVITIES OF DAILY LIVING: Primary | ICD-10-CM

## 2024-07-01 PROCEDURE — 97112 NEUROMUSCULAR REEDUCATION: CPT | Mod: PN

## 2024-07-01 PROCEDURE — 97110 THERAPEUTIC EXERCISES: CPT | Mod: PN

## 2024-07-01 NOTE — PROGRESS NOTES
[facility]   Physical Therapy Treatment Note      Name: Kelechi Suh  [facility] Number: [id number]    Therapy Diagnosis:   Encounter Diagnosis   Name Primary?    Impaired activities of daily living Yes     Physician: Colleen Thao MD    Visit Date: 7/1/2024    Physician Orders: PT Eval and Treat   Medical Diagnosis from Referral: Chronic bilateral shoulder pain   Evaluation Date: 6/18/2024  Authorization Period Expiration: 21/31/24  Plan of Care Expiration: 8/27/24  Progress Note Due: 7/18/24  Visit # / Visits authorized: 2/20 auth (1/1 evaluation)  FOTO: 1/3 Shoulder 61/100     Precautions: Standard      Date: 6/18/2024   Time In: 0500 pm  Time Out: 0555 pm    Total Appointment Time (timed & untimed codes): 55 minutes    PTA Visit #: 0/5     Subjective     Patient reports: that he is pleased with the additional exercises he is able to perform at home. He states that he would like to just continue with home and gym activities at this time and see how his shoulders progress. He plans to contact in weeks time to inform us of his progress.     He was compliant with home exercise program.  Response to previous treatment: soreness  Functional change: in progress    Pain: 0/10  Location: shoulders    Objective      Objective Measures updated at progress report unless specified.     Treatment     Kelechi received the treatments listed below:      therapeutic exercises to develop strength, endurance, ROM, flexibility, posture, and core stabilization for (12) minutes including:  UBE forward and back 3/3 6 min total  Resisted rows single arm 2x10 reps 23#  Resisted single arm seated lat pull down 2x10 reps 23#    neuromuscular re-education activities to improve: Balance, Coordination, Kinesthetic, Sense, Proprioception, and Posture for (43) minutes. The following activities were included:  [place] ER x20 reps seated free weight 5# x10 reps     Prone row 2x10 reps 5# B  Prone extension 2x10  reps 5# B  Prone horizontal abduction 2x10 reps 5# B       Willis raise on bench x10 reps B 5#  Side lying ER 5# x10 reps B  Resisted ER RTB x20 reps B  Wall clocks RTB x10 reps B  Resisted shoulder flexion RTB x20 reps B  Kinesiotaping right shoulder    Patient Education and Home Exercises       Education provided:   - HEP provided and reviewed  - Anatomy and Physiology pertaining to current condition  - Possible response to exercise  - Importance of PT compliance    Written Home Exercises Provided: Patient instructed to cont prior HEP. Exercises were reviewed and Kelechi was able to demonstrate them prior to the end of the session.  Kelechi demonstrated good  understanding of the education provided. See Electronic Medical Record under Patient Instructions for exercises provided during therapy sessions    Assessment     PT and patient spoke of additional strengthening and stability exercises to perform without provoking ROS in both shoulders. Pt demonstrates understanding of all exercises performed. Kinesiotaping was also applied to right shoulder in hopes to assist with work duties and gym activities without pain. Pt will continue with home exercises at this time with possible DC if symptoms remain manageable.     Kelechi Is progressing well towards his goals.   Patient prognosis is Good.     Patient will continue to benefit from skilled outpatient physical therapy to address the deficits listed in the problem list box on initial evaluation, provide pt/family education and to maximize pt's level of independence in the home and community environment.     Patient's spiritual, cultural and educational needs considered and pt agreeable to plan of care and goals.     Anticipated barriers to physical therapy: none    Goals:  Short Term Goals Status Established Target Met   Patient to be independent and compliant with foundational home exercise program performance to impact knowledge of condition  [x] Met  [] Not Met  []  Progressing 6/18/2024 7/18/24     Patient to lift 8#single arm overhead without increased pain indicating improved shoulder strength and stabilization [] Met  [] Not Met  [x] Progressing 6/18/2024 7/18/24     Patient to improve Shoulder FOTO to 69 to display improved activity participation [] Met  [] Not Met  [x] Progressing 6/18/2024 7/18/24        Long Term Goal Status Established Target Met   Patient will display independent and correct performance of advanced home exercise program without cueing to impact knowledge of condition [x] Met  [] Not Met  [] Progressing 6/18/2024 8/27/24     Patient to lift 20# + single arm overhead without increased pain indicating improved shoulder strength and stabilization [] Met  [] Not Met  [x] Progressing 6/18/2024 8/27/24     Patient to perform weight bearing bilateral shoulder tasks of push-ups without scapular winging or increased pain to display improved shoulder stabilization and impact activities of daily living and recreational activity performance [] Met  [] Not Met  [x] Progressing 6/18/2024 8/27/24     Patient to improve Shoulder FOTO to 77 to display improved activity participation [] Met  [] Not Met  [x] Progressing 6/18/2024 8/27/24     Patient will report confidence in managing condition upon discharge from Physical Therapy. [x] Met  [] Not Met  [] Progressing 6/18/2024 8/27/24       Plan     Continue with PT POC to address functional deficits. Pt will continue with home exercises at this time. Will update in weeks time.     Jakob Tadeo, PT DPT

## 2024-07-02 PROBLEM — Z00.00 ANNUAL PHYSICAL EXAM: Status: ACTIVE | Noted: 2024-07-02

## 2024-07-02 PROBLEM — M25.512 CHRONIC PAIN OF BOTH SHOULDERS: Status: ACTIVE | Noted: 2024-07-02

## 2024-07-02 PROBLEM — L70.9 ACNE: Status: ACTIVE | Noted: 2024-07-02

## 2024-07-02 PROBLEM — M25.511 CHRONIC PAIN OF BOTH SHOULDERS: Status: ACTIVE | Noted: 2024-07-02

## 2024-07-02 PROBLEM — G89.29 CHRONIC PAIN OF BOTH SHOULDERS: Status: ACTIVE | Noted: 2024-07-02

## 2024-07-02 PROBLEM — F41.9 ANXIETY AND DEPRESSION: Status: ACTIVE | Noted: 2024-07-02

## 2024-07-02 PROBLEM — F32.A ANXIETY AND DEPRESSION: Status: ACTIVE | Noted: 2024-07-02

## 2024-10-07 PROBLEM — Z00.00 ANNUAL PHYSICAL EXAM: Status: RESOLVED | Noted: 2024-07-02 | Resolved: 2024-10-07

## 2024-10-22 ENCOUNTER — OFFICE VISIT (OUTPATIENT)
Dept: FAMILY MEDICINE | Facility: CLINIC | Age: 19
End: 2024-10-22
Payer: OTHER GOVERNMENT

## 2024-10-22 VITALS — BODY MASS INDEX: 23.03 KG/M2 | WEIGHT: 170 LBS | HEIGHT: 72 IN

## 2024-10-22 DIAGNOSIS — F41.9 ANXIETY AND DEPRESSION: Primary | ICD-10-CM

## 2024-10-22 DIAGNOSIS — F32.A ANXIETY AND DEPRESSION: Primary | ICD-10-CM

## 2024-10-22 DIAGNOSIS — F90.2 ATTENTION DEFICIT HYPERACTIVITY DISORDER (ADHD), COMBINED TYPE: ICD-10-CM

## 2024-10-22 PROCEDURE — G2211 COMPLEX E/M VISIT ADD ON: HCPCS | Mod: 95,,, | Performed by: STUDENT IN AN ORGANIZED HEALTH CARE EDUCATION/TRAINING PROGRAM

## 2024-10-22 PROCEDURE — 99214 OFFICE O/P EST MOD 30 MIN: CPT | Mod: 95,,, | Performed by: STUDENT IN AN ORGANIZED HEALTH CARE EDUCATION/TRAINING PROGRAM

## 2024-10-22 RX ORDER — DEXTROAMPHETAMINE SACCHARATE, AMPHETAMINE ASPARTATE MONOHYDRATE, DEXTROAMPHETAMINE SULFATE AND AMPHETAMINE SULFATE 2.5; 2.5; 2.5; 2.5 MG/1; MG/1; MG/1; MG/1
10 CAPSULE, EXTENDED RELEASE ORAL EVERY MORNING
Qty: 30 CAPSULE | Refills: 0 | Status: SHIPPED | OUTPATIENT
Start: 2024-10-22 | End: 2024-11-21

## 2024-10-22 RX ORDER — DEXTROAMPHETAMINE SACCHARATE, AMPHETAMINE ASPARTATE MONOHYDRATE, DEXTROAMPHETAMINE SULFATE AND AMPHETAMINE SULFATE 2.5; 2.5; 2.5; 2.5 MG/1; MG/1; MG/1; MG/1
10 CAPSULE, EXTENDED RELEASE ORAL EVERY MORNING
Qty: 30 CAPSULE | Refills: 0 | Status: SHIPPED | OUTPATIENT
Start: 2024-11-21 | End: 2024-12-21

## 2024-10-22 RX ORDER — DEXTROAMPHETAMINE SACCHARATE, AMPHETAMINE ASPARTATE MONOHYDRATE, DEXTROAMPHETAMINE SULFATE AND AMPHETAMINE SULFATE 2.5; 2.5; 2.5; 2.5 MG/1; MG/1; MG/1; MG/1
10 CAPSULE, EXTENDED RELEASE ORAL EVERY MORNING
Qty: 30 CAPSULE | Refills: 0 | Status: SHIPPED | OUTPATIENT
Start: 2024-12-21 | End: 2025-01-20

## 2024-10-22 NOTE — PROGRESS NOTES
The patient location is: [place]  The chief complaint leading to consultation is: add    Visit type: audiovisual    Time with patient: 10 minutes  15 minutes of total time spent on the encounter, which includes face to face time and non-face to face time preparing to see the patient (eg, review of tests), Obtaining and/or reviewing separately obtained history, Documenting clinical information in the electronic or other health record, Independently interpreting results (not separately reported) and communicating results to the patient/family/caregiver, or Care coordination (not separately reported).     Each patient to whom he or she provides medical services by telemedicine is:  (1) informed of the relationship between the physician and patient and the respective role of any other health care provider with respect to management of the patient; and (2) notified that he or she may decline to receive medical services by telemedicine and may withdraw from such care at any time.       Problem List Items Addressed This Visit          Psychiatric    Attention deficit hyperactivity disorder (ADHD)    Overview     Chronic hx; will try adderall 10mg xr     [facility] Controlled Prescription Drug Monitoring database was queried and showed no activity to suggest abuse, diversion, or other inappropriate use of prescription medications.  #30 tabs, 2 refills   RTC 3 months            Relevant Medications    dextroamphetamine-amphetamine (ADDERALL XR) 10 MG 24 hr capsule    dextroamphetamine-amphetamine (ADDERALL XR) 10 MG 24 hr capsule (Start on 11/21/2024)    dextroamphetamine-amphetamine (ADDERALL XR) 10 MG 24 hr capsule (Start on 12/21/2024)    Anxiety and depression - Primary    Overview     Chronic hx; improving passive SI thoughts. Denies plan      Previously tried:  Wellbutrin - ineffective            DERMATITIS:  - Dermatitis appears improved with previous treatment regimen and hygiene measures.  - Patient to  continue current hygiene practices for managing dermatitis.    OTHER INSTRUCTIONS:  - Recommend maintaining physical activity, such as cycling.    FOLLOW UP:  - Follow up virtually in 3 months.  - Contact the office if Adderall is unavailable at the specified pharmacy.             Colleen Thao MD  _________________________________________________________________________      Patient ID: Kelechi Suh is a 19 y.o. male.    History of Present Illness    CHIEF COMPLAINT:  Patient presents today for follow-up on depression medication and to discuss ADHD treatment.    DEPRESSION:  He reports trialing Wellbutrin for depression for approximately one month, including an increase to twice daily dosing after 30 days. He found the medication ineffective in improving symptoms and has since discontinued use. Despite this, he reports his current mood has been improving. He acknowledges working on his mental health daily and developing coping mechanisms such as exercise and biking. He recognizes the fluctuating nature of his depression symptoms but feels he is making progress in managing his condition.    ADHD:  He reports a previous diagnosis of ADHD since childhood. He expresses interest in trying Adderall, despite past negative experiences with a similar medication when younger, which caused appetite suppression and other undesirable effects. He discloses a family history of ADHD, mentioning that his mother currently takes Adderall.    DERMATITIS:  He was diagnosed with dermatitis by a dermatologist in June. Previous treatment included a one-month course of doxycycline and topical creams. He states his condition has improved significantly since his last appointment. He uses the prescribed cream when he feels the condition is worsening. He has been following the dermatologist's advice to maintain better hygiene and avoid touching his face, which has contributed to the improvement in his skin condition.          Past  medical histories reviewed, including past medical, surgical, family and social histories.      Current Outpatient Medications on File Prior to Visit   Medication Sig Dispense Refill    clindamycin-benzoyl peroxide (BENZACLIN) gel Apply topically 2 (two) times daily. 50 g 1    doxycycline (MONODOX) 100 MG capsule Take 100 mg by mouth 2 (two) times daily.      pimecrolimus (ELIDEL) 1 % cream Apply topically 2 (two) times daily. 30 g 3    valACYclovir (VALTREX) 500 MG tablet Take 500 mg by mouth 2 (two) times daily.       No current facility-administered medications on file prior to visit.       Review of Systems   12 point review of systems negative except for listed in HPI.     Objective:    Nursing note and vitals reviewed.  There were no vitals filed for this visit.  Body mass index is 23.06 kg/m².     Physical Exam   No vitals or full physical exam obtained as this is a virtual visit  Gen: no distress, comfortable          We Offer Telehealth & Same Day Appointments!   Book your Telehealth appointment with me through my nurse or   Clinic appointments on [employer]!  [phone]     To Schedule appointments online, go to [employer]: [email or web address]       Visit today included increased complexity associated with the care of the episodic problem addressed and managing the longitudinal care of the patient due to the serious and/or complex managed problem(s) as per problem list.

## 2024-10-22 NOTE — PATIENT INSTRUCTIONS
Neri Lorenz,     If you are due for any health screening(s) below please notify me so we can arrange them to be ordered and scheduled. Most healthy patients at your age complete them, but you are free to accept or refuse.     If you can't do it, I'll definitely understand. If you can, I'd certainly appreciate it!    All of your core healthy metrics are met.

## 2024-12-03 DIAGNOSIS — F90.2 ATTENTION DEFICIT HYPERACTIVITY DISORDER (ADHD), COMBINED TYPE: ICD-10-CM

## 2024-12-03 NOTE — TELEPHONE ENCOUNTER
No care due was identified.  Health [facility] Embedded Care Due Messages. Reference number: [id number].   12/03/2024 12:46:41 PM CST

## 2024-12-04 RX ORDER — DEXTROAMPHETAMINE SACCHARATE, AMPHETAMINE ASPARTATE MONOHYDRATE, DEXTROAMPHETAMINE SULFATE AND AMPHETAMINE SULFATE 2.5; 2.5; 2.5; 2.5 MG/1; MG/1; MG/1; MG/1
10 CAPSULE, EXTENDED RELEASE ORAL 2 TIMES DAILY
Qty: 60 CAPSULE | Refills: 0 | Status: SHIPPED | OUTPATIENT
Start: 2024-12-04 | End: 2025-01-03

## 2024-12-04 NOTE — TELEPHONE ENCOUNTER
Refill Routing Note   Medication(s) are not appropriate for processing by [facility] for the following reason(s):        Outside of protocol    [facility] action(s):  Route               Appointments  past 12m or future 3m with PCP    Date Provider   Last Visit   10/22/2024 Colleen Thao MD   Next Visit   12/6/2024 Colleen Thao MD   ED visits in past 90 days: 0        Note composed:11:37 PM 12/03/2024

## 2024-12-06 ENCOUNTER — OFFICE VISIT (OUTPATIENT)
Dept: FAMILY MEDICINE | Facility: CLINIC | Age: 19
End: 2024-12-06
Payer: OTHER GOVERNMENT

## 2024-12-06 VITALS — BODY MASS INDEX: 23.03 KG/M2 | WEIGHT: 170 LBS | HEIGHT: 72 IN

## 2024-12-06 DIAGNOSIS — F90.2 ATTENTION DEFICIT HYPERACTIVITY DISORDER (ADHD), COMBINED TYPE: Primary | ICD-10-CM

## 2024-12-06 NOTE — PROGRESS NOTES
The patient location is: [place]  The chief complaint leading to consultation is: ADD    Visit type: audiovisual    Time with patient: 10 minutes  15 minutes of total time spent on the encounter, which includes face to face time and non-face to face time preparing to see the patient (eg, review of tests), Obtaining and/or reviewing separately obtained history, Documenting clinical information in the electronic or other health record, Independently interpreting results (not separately reported) and communicating results to the patient/family/caregiver, or Care coordination (not separately reported).     Each patient to whom he or she provides medical services by telemedicine is:  (1) informed of the relationship between the physician and patient and the respective role of any other health care provider with respect to management of the patient; and (2) notified that he or she may decline to receive medical services by telemedicine and may withdraw from such care at any time.       Problem List Items Addressed This Visit          Psychiatric    Attention deficit hyperactivity disorder (ADHD) - Primary    Overview     Chronic hx; increase to adderall 10mg xr bid    [place] Board [place] [employer] Controlled Prescription Drug Monitoring database was queried and showed no activity to suggest abuse, diversion, or other inappropriate use of prescription medications.  #60 tabs, 2 refills   RTC 3 months            Relevant Orders    MYC E-VISIT       Assessment & Plan    ATTENTION-DEFICIT HYPERACTIVITY DISORDER (ADHD):  - Low dose of Adderall 10mg working well for focus, but wears off by lunchtime.  - Trial of twice daily dosing recommended to extend duration of effect.  - Increased Adderall from 10mg daily to 10mg twice daily.  - Take first dose at 5:30 AM and second dose at lunchtime.    FOLLOW-UP:  - Will reassess efficacy of new regimen in 3 weeks via questionnaire.  - Complete questionnaire in [facility] in 3 weeks to assess  efficacy of new medication regimen.             Colleen Thao MD  _________________________________________________________________________      Patient ID: Kelechi Suh is a 19 y.o. male.    History of Present Illness    CHIEF COMPLAINT:  Patient presents today for follow-up on Adderall medication.    ADHD MANAGEMENT:  He is currently taking 10mg Adderall daily at 5:30 AM. He reports the medication is effective for the first few hours, lasting until about lunchtime. When the medication wears off, he gets distracted more easily. He expresses satisfaction with the medication's ability to improve focus during its effective period. He recently tried taking a second dose at lunchtime and liked the effects of twice-daily dosing. The effects lasted until about 4 PM, at which point he was still focused but not hyper-focused. He reports feeling more productive at home, able to complete household chores that he would typically procrastinate on. He expresses satisfaction with the extended duration of medication effects throughout the day.          Past medical histories reviewed, including past medical, surgical, family and social histories.      Current Outpatient Medications on File Prior to Visit   Medication Sig Dispense Refill    dextroamphetamine-amphetamine (ADDERALL XR) 10 MG 24 hr capsule Take 1 capsule (10 mg total) by mouth 2 (two) times a day. 60 capsule 0    [DISCONTINUED] clindamycin-benzoyl peroxide (BENZACLIN) gel Apply topically 2 (two) times daily. (Patient not taking: Reported on 12/6/2024) 50 g 1    [DISCONTINUED] doxycycline (MONODOX) 100 MG capsule Take 100 mg by mouth 2 (two) times daily. (Patient not taking: Reported on 12/6/2024)      [DISCONTINUED] pimecrolimus (ELIDEL) 1 % cream Apply topically 2 (two) times daily. (Patient not taking: Reported on 12/6/2024) 30 g 3    [DISCONTINUED] valACYclovir (VALTREX) 500 MG tablet Take 500 mg by mouth 2 (two) times daily. (Patient not taking: Reported  on 12/6/2024)       No current facility-administered medications on file prior to visit.       Review of Systems   12 point review of systems negative except for listed in HPI.     Objective:    Nursing note and vitals reviewed.  There were no vitals filed for this visit.  Body mass index is 23.05 kg/m².     Physical Exam   No vitals or full physical exam obtained as this is a virtual visit  Gen: no distress, comfortable          We Offer Telehealth & Same Day Appointments!   Book your Telehealth appointment with me through my nurse or   Clinic appointments on [employer]!  [phone]     To Schedule appointments online, go to K[employer]: [email or web address]       Visit today included increased complexity associated with the care of the episodic problem addressed and managing the longitudinal care of the patient due to the serious and/or complex managed problem(s) as per problem list.     This note was generated with the assistance of ambient listening technology. Verbal consent was obtained by the patient and accompanying visitor(s) for the recording of patient appointment to facilitate this note. I attest to having reviewed and edited the generated note for accuracy, though some syntax or spelling errors may persist. Please contact the author of this note for any clarification.

## 2025-01-09 ENCOUNTER — PATIENT MESSAGE (OUTPATIENT)
Dept: FAMILY MEDICINE | Facility: CLINIC | Age: 20
End: 2025-01-09

## 2025-01-09 DIAGNOSIS — F90.2 ATTENTION DEFICIT HYPERACTIVITY DISORDER (ADHD), COMBINED TYPE: ICD-10-CM

## 2025-01-09 RX ORDER — DEXTROAMPHETAMINE SACCHARATE, AMPHETAMINE ASPARTATE MONOHYDRATE, DEXTROAMPHETAMINE SULFATE AND AMPHETAMINE SULFATE 5; 5; 5; 5 MG/1; MG/1; MG/1; MG/1
20 CAPSULE, EXTENDED RELEASE ORAL 2 TIMES DAILY
Qty: 60 CAPSULE | Refills: 0 | OUTPATIENT
Start: 2025-01-09 | End: 2025-03-10

## 2025-01-09 NOTE — TELEPHONE ENCOUNTER
No care due was identified.  [facility] Embedded Care Due Messages. Reference number: [id number].   1/09/2025 12:47:08 PM CST

## 2025-02-11 DIAGNOSIS — F90.2 ATTENTION DEFICIT HYPERACTIVITY DISORDER (ADHD), COMBINED TYPE: ICD-10-CM

## 2025-02-11 RX ORDER — DEXTROAMPHETAMINE SACCHARATE, AMPHETAMINE ASPARTATE MONOHYDRATE, DEXTROAMPHETAMINE SULFATE AND AMPHETAMINE SULFATE 5; 5; 5; 5 MG/1; MG/1; MG/1; MG/1
20 CAPSULE, EXTENDED RELEASE ORAL 2 TIMES DAILY
Qty: 60 CAPSULE | Refills: 0 | Status: CANCELLED | OUTPATIENT
Start: 2025-02-11 | End: 2025-04-12

## 2025-02-11 NOTE — TELEPHONE ENCOUNTER
No care due was identified.  Health [facility] Embedded Care Due Messages. Reference number: [id number].   2/11/2025 5:12:26 PM CST

## 2025-02-12 RX ORDER — DEXTROAMPHETAMINE SACCHARATE, AMPHETAMINE ASPARTATE, DEXTROAMPHETAMINE SULFATE AND AMPHETAMINE SULFATE 5; 5; 5; 5 MG/1; MG/1; MG/1; MG/1
1 TABLET ORAL DAILY
Qty: 30 TABLET | Refills: 0 | Status: SHIPPED | OUTPATIENT
Start: 2025-02-12

## 2025-02-12 NOTE — TELEPHONE ENCOUNTER
Refill Routing Note   Medication(s) are not appropriate for processing by [facility] for the following reason(s):        Outside of protocol    [facility] action(s):  Route               Appointments  past 12m or future 3m with PCP    Date Provider   Last Visit   12/6/2024 Colleen Thao MD   Next Visit   Visit date not found Colleen Thao MD   ED visits in past 90 days: 0        Note composed:8:21 AM 02/12/2025

## 2025-02-17 ENCOUNTER — PATIENT MESSAGE (OUTPATIENT)
Dept: FAMILY MEDICINE | Facility: CLINIC | Age: 20
End: 2025-02-17

## 2025-02-18 ENCOUNTER — OFFICE VISIT (OUTPATIENT)
Dept: FAMILY MEDICINE | Facility: CLINIC | Age: 20
End: 2025-02-18

## 2025-02-18 ENCOUNTER — PATIENT MESSAGE (OUTPATIENT)
Dept: FAMILY MEDICINE | Facility: CLINIC | Age: 20
End: 2025-02-18

## 2025-02-18 DIAGNOSIS — F90.2 ATTENTION DEFICIT HYPERACTIVITY DISORDER (ADHD), COMBINED TYPE: Primary | ICD-10-CM

## 2025-02-18 RX ORDER — DEXTROAMPHETAMINE SACCHARATE, AMPHETAMINE ASPARTATE MONOHYDRATE, DEXTROAMPHETAMINE SULFATE AND AMPHETAMINE SULFATE 7.5; 7.5; 7.5; 7.5 MG/1; MG/1; MG/1; MG/1
30 CAPSULE, EXTENDED RELEASE ORAL EVERY MORNING
Qty: 30 CAPSULE | Refills: 0 | Status: SHIPPED | OUTPATIENT
Start: 2025-02-18 | End: 2025-03-20

## 2025-02-18 RX ORDER — DEXTROAMPHETAMINE SACCHARATE, AMPHETAMINE ASPARTATE, DEXTROAMPHETAMINE SULFATE AND AMPHETAMINE SULFATE 5; 5; 5; 5 MG/1; MG/1; MG/1; MG/1
1 TABLET ORAL DAILY
Qty: 30 TABLET | Refills: 0 | Status: SHIPPED | OUTPATIENT
Start: 2025-02-18 | End: 2025-03-20

## 2025-02-18 NOTE — PROGRESS NOTES
The patient location is: [place]  The chief complaint leading to consultation is: add    Visit type: audiovisual    Time with patient: 10 minutes  15 minutes of total time spent on the encounter, which includes face to face time and non-face to face time preparing to see the patient (eg, review of tests), Obtaining and/or reviewing separately obtained history, Documenting clinical information in the electronic or other health record, Independently interpreting results (not separately reported) and communicating results to the patient/family/caregiver, or Care coordination (not separately reported).     Each patient to whom he or she provides medical services by telemedicine is:  (1) informed of the relationship between the physician and patient and the respective role of any other health care provider with respect to management of the patient; and (2) notified that he or she may decline to receive medical services by telemedicine and may withdraw from such care at any time.       1. Attention deficit hyperactivity disorder (ADHD), combined type  Overview:  Chronic hx;   Trial of adderall 30mg xr am and 20mg ir pm      Denies mood instability, irritability, aggression, palpitations, chest pain, weight loss, decreased appetite, disordered sleep  Pt is demonstrating no behaviors to suggest inappropriate use of prescribed medications.    [place] Board  [employer]armacy Controlled Prescription Drug Monitoring database was queried and showed no activity to suggest abuse, diversion, or other inappropriate use of prescription medications.        Orders:  -     dextroamphetamine-amphetamine (ADDERALL XR) 30 MG 24 hr capsule; Take 1 capsule (30 mg total) by mouth every morning.  Dispense: 30 capsule; Refill: 0  -     dextroamphetamine-amphetamine (ADDERALL) 20 mg tablet; Take 1 tablet by mouth once daily.  Dispense: 30 tablet; Refill: 0             Colleen Thao  MD  _________________________________________________________________________      Patient ID: Kelechi Suh is a 19 y.o. male.    History of Present Illness    CHIEF COMPLAINT:  Patient presents today for medication adjustment    ADHD:  He is currently taking Adderall XR 20mg twice daily (morning and noon), which lasts until approximately 6-7 PM. He reports the medication helps with focus during work and physical activities, but continues to have difficulty focusing during conversations and while reading. He denies any side effects.       Past medical histories reviewed, including past medical, surgical, family and social histories.      Medications Ordered Prior to Encounter[1]    Review of Systems   12 point review of systems negative except for listed in HPI.     Objective:    Nursing note and vitals reviewed.  There were no vitals filed for this visit.  There is no height or weight on file to calculate BMI.     Physical Exam   No vitals or full physical exam obtained as this is a virtual visit  Gen: no distress, comfortable          We Offer Telehealth & Same Day Appointments!   Book your Telehealth appointment with me through my nurse or   Clinic appointments on [employer]!  [phone]     To Schedule appointments online, go to [employer]: [email or web address]       Visit today included increased complexity associated with the care of the episodic problem addressed and managing the longitudinal care of the patient due to the serious and/or complex managed problem(s) as per problem list.     This note was generated with the assistance of ambient listening technology. Verbal consent was obtained by the patient and accompanying visitor(s) for the recording of patient appointment to facilitate this note. I attest to having reviewed and edited the generated note for accuracy, though some syntax or spelling errors may persist. Please contact the author of this note for any  clarification.             [1]   Current Outpatient Medications on File Prior to Visit   Medication Sig Dispense Refill    [DISCONTINUED] dextroamphetamine-amphetamine (ADDERALL XR) 20 MG 24 hr capsule Take 1 capsule (20 mg total) by mouth 2 (two) times a day. 60 capsule 0    [DISCONTINUED] dextroamphetamine-amphetamine (ADDERALL) 20 mg tablet Take 1 tablet by mouth once daily. 30 tablet 0     No current facility-administered medications on file prior to visit.

## 2025-03-13 ENCOUNTER — OFFICE VISIT (OUTPATIENT)
Dept: FAMILY MEDICINE | Facility: CLINIC | Age: 20
End: 2025-03-13

## 2025-03-13 DIAGNOSIS — F41.9 ANXIETY AND DEPRESSION: ICD-10-CM

## 2025-03-13 DIAGNOSIS — F90.2 ATTENTION DEFICIT HYPERACTIVITY DISORDER (ADHD), COMBINED TYPE: Primary | ICD-10-CM

## 2025-03-13 DIAGNOSIS — F32.A ANXIETY AND DEPRESSION: ICD-10-CM

## 2025-03-13 RX ORDER — DEXTROAMPHETAMINE SACCHARATE, AMPHETAMINE ASPARTATE, DEXTROAMPHETAMINE SULFATE AND AMPHETAMINE SULFATE 5; 5; 5; 5 MG/1; MG/1; MG/1; MG/1
1 TABLET ORAL DAILY
Qty: 30 TABLET | Refills: 0 | Status: SHIPPED | OUTPATIENT
Start: 2025-03-13 | End: 2025-04-12

## 2025-03-13 RX ORDER — DEXTROAMPHETAMINE SACCHARATE, AMPHETAMINE ASPARTATE, DEXTROAMPHETAMINE SULFATE AND AMPHETAMINE SULFATE 5; 5; 5; 5 MG/1; MG/1; MG/1; MG/1
1 TABLET ORAL DAILY
Qty: 30 TABLET | Refills: 0 | Status: SHIPPED | OUTPATIENT
Start: 2025-05-13 | End: 2025-06-12

## 2025-03-13 RX ORDER — DEXTROAMPHETAMINE SACCHARATE, AMPHETAMINE ASPARTATE MONOHYDRATE, DEXTROAMPHETAMINE SULFATE AND AMPHETAMINE SULFATE 7.5; 7.5; 7.5; 7.5 MG/1; MG/1; MG/1; MG/1
30 CAPSULE, EXTENDED RELEASE ORAL EVERY MORNING
Qty: 30 CAPSULE | Refills: 0 | Status: SHIPPED | OUTPATIENT
Start: 2025-03-13 | End: 2025-04-12

## 2025-03-13 RX ORDER — DEXTROAMPHETAMINE SACCHARATE, AMPHETAMINE ASPARTATE, DEXTROAMPHETAMINE SULFATE AND AMPHETAMINE SULFATE 5; 5; 5; 5 MG/1; MG/1; MG/1; MG/1
1 TABLET ORAL DAILY
Qty: 30 TABLET | Refills: 0 | Status: SHIPPED | OUTPATIENT
Start: 2025-04-13 | End: 2025-05-13

## 2025-03-13 RX ORDER — DEXTROAMPHETAMINE SACCHARATE, AMPHETAMINE ASPARTATE MONOHYDRATE, DEXTROAMPHETAMINE SULFATE AND AMPHETAMINE SULFATE 7.5; 7.5; 7.5; 7.5 MG/1; MG/1; MG/1; MG/1
30 CAPSULE, EXTENDED RELEASE ORAL EVERY MORNING
Qty: 30 CAPSULE | Refills: 0 | Status: SHIPPED | OUTPATIENT
Start: 2025-04-12 | End: 2025-05-12

## 2025-03-13 RX ORDER — DEXTROAMPHETAMINE SACCHARATE, AMPHETAMINE ASPARTATE MONOHYDRATE, DEXTROAMPHETAMINE SULFATE AND AMPHETAMINE SULFATE 7.5; 7.5; 7.5; 7.5 MG/1; MG/1; MG/1; MG/1
30 CAPSULE, EXTENDED RELEASE ORAL EVERY MORNING
Qty: 30 CAPSULE | Refills: 0 | Status: SHIPPED | OUTPATIENT
Start: 2025-05-12 | End: 2025-06-11

## 2025-03-13 NOTE — PROGRESS NOTES
The patient location is: [place]  The chief complaint leading to consultation is:  Med refill    Visit type: audiovisual    Time with patient: 10 minutes  15 minutes of total time spent on the encounter, which includes face to face time and non-face to face time preparing to see the patient (eg, review of tests), Obtaining and/or reviewing separately obtained history, Documenting clinical information in the electronic or other health record, Independently interpreting results (not separately reported) and communicating results to the patient/family/caregiver, or Care coordination (not separately reported).     Each patient to whom he or she provides medical services by telemedicine is:  (1) informed of the relationship between the physician and patient and the respective role of any other health care provider with respect to management of the patient; and (2) notified that he or she may decline to receive medical services by telemedicine and may withdraw from such care at any time.       1. Attention deficit hyperactivity disorder (ADHD), combined type  Overview:  Chronic hx;   Trial of adderall 30mg xr am and 20mg ir pm      Denies mood instability, irritability, aggression, palpitations, chest pain, weight loss, decreased appetite, disordered sleep  Pt is demonstrating no behaviors to suggest inappropriate use of prescribed medications.    [place] Board of Pharmacy Controlled Prescription Drug Monitoring database was queried and showed no activity to suggest abuse, diversion, or other inappropriate use of prescription medications.        Orders:  -     dextroamphetamine-amphetamine (ADDERALL XR) 30 MG 24 hr capsule; Take 1 capsule (30 mg total) by mouth every morning.  Dispense: 30 capsule; Refill: 0  -     dextroamphetamine-amphetamine (ADDERALL XR) 30 MG 24 hr capsule; Take 1 capsule (30 mg total) by mouth every morning.  Dispense: 30 capsule; Refill: 0  -     dextroamphetamine-amphetamine (ADDERALL XR) 30 MG 24  hr capsule; Take 1 capsule (30 mg total) by mouth every morning.  Dispense: 30 capsule; Refill: 0  -     dextroamphetamine-amphetamine (ADDERALL) 20 mg tablet; Take 1 tablet by mouth once daily.  Dispense: 30 tablet; Refill: 0  -     dextroamphetamine-amphetamine (ADDERALL) 20 mg tablet; Take 1 tablet by mouth once daily.  Dispense: 30 tablet; Refill: 0  -     dextroamphetamine-amphetamine (ADDERALL) 20 mg tablet; Take 1 tablet by mouth once daily.  Dispense: 30 tablet; Refill: 0    2. Anxiety and depression  Overview:  Chronic hx; improving passive SI thoughts. Denies plan  Adderall has been helping     Previously tried:  Wellbutrin - ineffective           Assessment & Plan          Colleen Thao MD  _________________________________________________________________________      Patient ID: Kelechi Suh is a 19 y.o. male.    History of Present Illness    CHIEF COMPLAINT:  Patient presents today for follow up of ADHD medication management    ADHD AND ANXIETY MANAGEMENT:  He takes Adderall 30 mg in the morning and 20 mg in the afternoon. He reports improved focus, reduced racing thoughts, and significant improvement in anxiety symptoms with better ability to concentrate on present tasks and reduced overthinking. He denies side effects including chest pain, irritability, palpitations, or aggressiveness. His sleep patterns remain normal without disturbances.          Past medical histories reviewed, including past medical, surgical, family and social histories.      Medications Ordered Prior to Encounter[1]    Review of Systems   12 point review of systems negative except for listed in HPI.     Objective:    Nursing note and vitals reviewed.  There were no vitals filed for this visit.  There is no height or weight on file to calculate BMI.     Physical Exam   No vitals or full physical exam obtained as this is a virtual visit  Gen: no distress, comfortable          We Offer Telehealth & Same Day Appointments!    Book your Telehealth appointment with me through my nurse or   Clinic appointments on [employer]!  Tezzdo-[phone]     To Schedule appointments online, go to [facility]: [email or web address]       Visit today included increased complexity associated with the care of the episodic problem addressed and managing the longitudinal care of the patient due to the serious and/or complex managed problem(s) as per problem list.     This note was generated with the assistance of ambient listening technology. Verbal consent was obtained by the patient and accompanying visitor(s) for the recording of patient appointment to facilitate this note. I attest to having reviewed and edited the generated note for accuracy, though some syntax or spelling errors may persist. Please contact the author of this note for any clarification.           [1]   Current Outpatient Medications on File Prior to Visit   Medication Sig Dispense Refill    dextroamphetamine-amphetamine (ADDERALL XR) 30 MG 24 hr capsule Take 1 capsule (30 mg total) by mouth every morning. 30 capsule 0    dextroamphetamine-amphetamine (ADDERALL) 20 mg tablet Take 1 tablet by mouth once daily. 30 tablet 0     No current facility-administered medications on file prior to visit.

## 2025-06-13 ENCOUNTER — TELEPHONE (OUTPATIENT)
Dept: FAMILY MEDICINE | Facility: CLINIC | Age: 20
End: 2025-06-13
Payer: COMMERCIAL

## 2025-06-13 ENCOUNTER — OFFICE VISIT (OUTPATIENT)
Dept: FAMILY MEDICINE | Facility: CLINIC | Age: 20
End: 2025-06-13
Payer: COMMERCIAL

## 2025-06-13 VITALS
DIASTOLIC BLOOD PRESSURE: 73 MMHG | HEIGHT: 72 IN | WEIGHT: 170 LBS | SYSTOLIC BLOOD PRESSURE: 124 MMHG | BODY MASS INDEX: 23.03 KG/M2

## 2025-06-13 DIAGNOSIS — F32.A ANXIETY AND DEPRESSION: ICD-10-CM

## 2025-06-13 DIAGNOSIS — F90.2 ATTENTION DEFICIT HYPERACTIVITY DISORDER (ADHD), COMBINED TYPE: Primary | ICD-10-CM

## 2025-06-13 DIAGNOSIS — F41.9 ANXIETY AND DEPRESSION: ICD-10-CM

## 2025-06-13 NOTE — PROGRESS NOTES
The patient location is: [place]  The chief complaint leading to consultation is: refill    Visit type: audiovisual    Time with patient: 10 minutes  15 minutes of total time spent on the encounter, which includes face to face time and non-face to face time preparing to see the patient (eg, review of tests), Obtaining and/or reviewing separately obtained history, Documenting clinical information in the electronic or other health record, Independently interpreting results (not separately reported) and communicating results to the patient/family/caregiver, or Care coordination (not separately reported).     Each patient to whom he or she provides medical services by telemedicine is:  (1) informed of the relationship between the physician and patient and the respective role of any other health care provider with respect to management of the patient; and (2) notified that he or she may decline to receive medical services by telemedicine and may withdraw from such care at any time.       1. Attention deficit hyperactivity disorder (ADHD), combined type  Overview:  Chronic hx;   Did well on adderall; however, interested in enlisting in  and reports he cannot be on add meds for 1 yr before enlisting       Denies mood instability, irritability, aggression, palpitations, chest pain, weight loss, decreased appetite, disordered sleep  Pt is demonstrating no behaviors to suggest inappropriate use of prescribed medications.    [place] Board of Pharmacy Controlled Prescription Drug Monitoring database was queried and showed no activity to suggest abuse, diversion, or other inappropriate use of prescription medications.          2. Anxiety and depression  Overview:  Chronic hx; improving passive SI thoughts. Denies plan  No longer taking adderall (see plan)    Previously tried:  Wellbutrin - ineffective           Assessment & Plan    ATTENTION-DEFICIT HYPERACTIVITY DISORDER (ADHD):  - Monitored patient who has been off  Adderall since April 26th.  - Patient reports difficulty focusing at work and in general without medication.  - Discussed decision to discontinue Adderall due to  enlistment requirements, which necessitate being off ADHD medication for 12 months.  - Considered non-stimulant alternatives like bupropion (Wellbutrin) for ADHD management if needed in the future to improve focus.  - Adderall prescription officially discontinued.    FOLLOW-UP:  - Recommend follow up with another virtual appointment if needed.             Colleen Thao MD  _________________________________________________________________________      Patient ID: Kelechi Suh is a 20 y.o. male.    History of Present Illness    CHIEF COMPLAINT:  Patient presents today to discuss discontinuing Adderall for  enlistment    ADHD MANAGEMENT:  He previously took Adderall 30 mg in the morning and 20 mg in the evening, with last prescription filled on April 26th. He discontinued Adderall to meet  enlistment requirement of being off ADHD medications for minimum of 12 months. Since stopping medication, he reports increased difficulty with focus and thinking, both at work and in other situations. His work routine has become less sophisticated without the medication. Despite these challenges, he remains motivated by his goal to enlist in the .          Past medical histories reviewed, including past medical, surgical, family and social histories.      Medications Ordered Prior to Encounter[1]    Review of Systems   12 point review of systems negative except for listed in HPI.     Objective:    Nursing note and vitals reviewed.  Vitals:    06/13/25 1550   BP: 124/73     Body mass index is 23.05 kg/m².     Physical Exam   No vitals or full physical exam obtained as this is a virtual visit  Gen: no distress, comfortable          We Offer Telehealth & Same Day Appointments!   Book your Telehealth appointment with me through my nurse  or   Clinic appointments on [employer]!  [phone]     To Schedule appointments online, go to [email or web address]: [email or web address]       Visit today included increased complexity associated with the care of the episodic problem addressed and managing the longitudinal care of the patient due to the serious and/or complex managed problem(s) as per problem list.     This note was generated with the assistance of ambient listening technology. Verbal consent was obtained by the patient and accompanying visitor(s) for the recording of patient appointment to facilitate this note. I attest to having reviewed and edited the generated note for accuracy, though some syntax or spelling errors may persist. Please contact the author of this note for any clarification.           [1]   Current Outpatient Medications on File Prior to Visit   Medication Sig Dispense Refill    [DISCONTINUED] dextroamphetamine-amphetamine (ADDERALL XR) 30 MG 24 hr capsule Take 1 capsule (30 mg total) by mouth every morning. 30 capsule 0    [DISCONTINUED] dextroamphetamine-amphetamine (ADDERALL) 20 mg tablet Take 1 tablet by mouth once daily. 30 tablet 0     No current facility-administered medications on file prior to visit.

## 2025-06-13 NOTE — TELEPHONE ENCOUNTER
Copied from CRM #[id number]. Topic: General Inquiry - Patient Advice  >> Jun 13, 2025 11:10 AM Sofi wrote:  Type:  Needs Medical Advice    Who Called: Kelechi Suh   Would the patient rather a call back or a response via [facility]? Call back  Best Call Back Number: [phone]  Additional Information: The patient called with his updated insurance and he will be available to for his virtual appt. At 4pm

## (undated) DEVICE — BANDAGE ACE ELASTIC 6"

## (undated) DEVICE — MANIFOLD 4 PORT

## (undated) DEVICE — DRESSING XEROFORM FOIL PK 1X8

## (undated) DEVICE — SEE MEDLINE ITEM 152529

## (undated) DEVICE — SEE MEDLINE ITEM 146231

## (undated) DEVICE — SEE MEDLINE ITEM 157216

## (undated) DEVICE — SYR 30CC LUER LOCK

## (undated) DEVICE — IMMOB KNEE UNIV TRI PANEL 19IN

## (undated) DEVICE — TUBING ARTHRO IRR 4-LEAD

## (undated) DEVICE — DRAPE PLASTIC U 60X72

## (undated) DEVICE — TOURNIQUET SB QC SP 34X4IN

## (undated) DEVICE — SEE MEDLINE ITEM 157027

## (undated) DEVICE — APPLICATOR CHLORAPREP ORN 26ML

## (undated) DEVICE — GOWN SMARTGOWN LVL4 X-LONG XL

## (undated) DEVICE — SOL IRR NACL .9% 3000ML

## (undated) DEVICE — DRESSING XEROFORM 1X8IN

## (undated) DEVICE — SUT ETHILON 3-0 PS2 18 BLK

## (undated) DEVICE — SUT 4.0 ETHILON

## (undated) DEVICE — SEE MEDLINE ITEM 157117

## (undated) DEVICE — SEE MEDLINE ITEM 152622

## (undated) DEVICE — SEE MEDLINE ITEM 152739

## (undated) DEVICE — SEE MEDLINE ITEM 152523

## (undated) DEVICE — NDL SPINAL 18GX3.5 SPINOCAN

## (undated) DEVICE — SPONGE DERMACEA GAUZE 4X4

## (undated) DEVICE — GAUZE SPONGE 4X4 12PLY

## (undated) DEVICE — SEE MEDLINE ITEM 146292

## (undated) DEVICE — PAD CAST SPECIALIST STRL 6

## (undated) DEVICE — ELECTRODE LOOP 20MMX12MM